# Patient Record
Sex: MALE | Race: WHITE
[De-identification: names, ages, dates, MRNs, and addresses within clinical notes are randomized per-mention and may not be internally consistent; named-entity substitution may affect disease eponyms.]

---

## 2018-07-26 ENCOUNTER — HOSPITAL ENCOUNTER (INPATIENT)
Dept: HOSPITAL 68 - ERH | Age: 53
LOS: 12 days | DRG: 881 | End: 2018-08-07
Attending: PSYCHIATRY & NEUROLOGY | Admitting: PSYCHIATRY & NEUROLOGY
Payer: COMMERCIAL

## 2018-07-26 VITALS — HEIGHT: 69 IN | BODY MASS INDEX: 36.14 KG/M2 | WEIGHT: 244 LBS

## 2018-07-26 DIAGNOSIS — F32.9: Primary | ICD-10-CM

## 2018-07-26 DIAGNOSIS — F42.9: ICD-10-CM

## 2018-07-26 LAB
ABSOLUTE GRANULOCYTE CT: 5.7 /CUMM (ref 1.4–6.5)
BASOPHILS # BLD: 0 /CUMM (ref 0–0.2)
BASOPHILS NFR BLD: 0.5 % (ref 0–2)
EOSINOPHIL # BLD: 0.1 /CUMM (ref 0–0.7)
EOSINOPHIL NFR BLD: 1.6 % (ref 0–5)
ERYTHROCYTE [DISTWIDTH] IN BLOOD BY AUTOMATED COUNT: 12.9 % (ref 11.5–14.5)
GRANULOCYTES NFR BLD: 64 % (ref 42.2–75.2)
HCT VFR BLD CALC: 39.8 % (ref 42–52)
LYMPHOCYTES # BLD: 2.5 /CUMM (ref 1.2–3.4)
MCH RBC QN AUTO: 29.1 PG (ref 27–31)
MCHC RBC AUTO-ENTMCNC: 32.9 G/DL (ref 33–37)
MCV RBC AUTO: 88.4 FL (ref 80–94)
MONOCYTES # BLD: 0.5 /CUMM (ref 0.1–0.6)
PLATELET # BLD: 201 /CUMM (ref 130–400)
PMV BLD AUTO: 9.1 FL (ref 7.4–10.4)
RED BLOOD CELL CT: 4.51 /CUMM (ref 4.7–6.1)
WBC # BLD AUTO: 8.9 /CUMM (ref 4.8–10.8)

## 2018-07-26 PROCEDURE — G0480 DRUG TEST DEF 1-7 CLASSES: HCPCS

## 2018-07-26 NOTE — CT SCAN REPORT
EXAMINATION:
CT ABDOMEN AND PELVIS WITH CONTRAST
 
CLINICAL INFORMATION:
Left lower quadrant pain. CVA tenderness.
 
COMPARISON:
None
 
TECHNIQUE:
Multidetector volumetric imaging was performed of the abdomen and pelvis
following IV administration of 95 mL of Optiray 320 intravenous contrast.
Sagittal and coronal reformatted images were obtained on the technologist's
workstation.
 
DLP:
1047.46 mGy-cm
 
FINDINGS:
 
LUNG BASES: The visualized lung bases are unremarkable.
 
LIVER, GALLBLADDER, AND BILIARY TREE: The liver is normal in size, shape, and
attenuation. No focal hepatic lesion or biliary ductal dilatation is present.
The gallbladder is unremarkable with no evidence of radiopaque gallstones,
gallbladder wall thickening, or obvious pericholecystic inflammatory changes.
 
 
PANCREAS: Unremarkable.
 
SPLEEN: Unremarkable.
 
ADRENAL GLANDS: Unremarkable.
 
KIDNEYS AND URETERS: The kidneys are normal in size, shape, and attenuation.
No hydronephrosis, hydroureter, or calculi seen. No perinephric stranding.
 
BLADDER: Unremarkable.
 
GASTROINTESTINAL TRACT: The small and large bowel are unremarkable. The
appendix is surgically absent.
 
Mesentery: There is slight haziness of the mid mesentery. This could be
chronic or related to mesenteritis but there is no lymphadenopathy. No
inflammation. No free fluid. No free air.
 
ABDOMINAL WALL: No significant hernia is appreciated.
 
LYMPH NODES: Normal.
 
VASCULAR: Unremarkable.
 
PELVIC VISCERA: Unremarkable.
 
OSSEOUS STRUCTURES: Unremarkable.  There is linear soft tissue ossification
along the fascial plane superficial to the right buttocks and adjacent to the
left inferior sacroiliac joint. This is consistent with a chronic change.
There is no mass or inflammation.
 
IMPRESSION:
No acute abnormality CT scan abdomen and pelvis.

## 2018-07-26 NOTE — ED GI/GU/ABDOMINAL COMPLAINT
History of Present Illness
 
General
Chief Complaint: Abdominal Pain/Flank Pain
Stated Complaint: UPPER ABD PAIN
Source: patient
Exam Limitations: no limitations
 
Vital Signs & Intake/Output
Vital Signs & Intake/Output
 Vital Signs
 
 
Date Time Temp Pulse Resp B/P B/P Pulse O2 O2 Flow FiO2
 
     Mean Ox Delivery Rate 
 
2113 97.7 89 16 125/76     
 
2018 97.7 89  125/76     
 
1954 97.7 89 16 121/71     
 
 1947 97.7 89  121/71     
 
 1810  70  137/81     
 
 1247 96.9 70  135/84     
 
 1057 98.4 92 16 105/55  96   
 
 1053 98.4 86 20 158/96  96   
 
 0851 98.7 65 18 114/68  96   
 
 
 ED Intake and Output
 
 
  0000  1200
 
Intake Total  
 
Output Total  
 
Balance  
 
   
 
Patient 244 lb 
 
Weight  
 
 
 
Allergies
Coded Allergies:
No Known Allergies (18)
 
Reconcile Medications
Aspirin (Ecotrin*) 81 MG TABLET.DR   1 TAB PO QAM HEART/BLOOD  (Reported)
Atenolol 50 MG TABLET   1 TAB PO QHS BP  (Reported)
Atorvastatin Calcium (Lipitor) 40 MG TABLET   1 TAB PO QPM CHOLESTEROL  (
Reported)
Clonazepam 1 MG TABLET   1 TAB PO BID ANXIETY  (Reported)
Fluoxetine HCl 40 MG CAPSULE   2 CAP PO QAM MENTAL HEALTH  (Reported)
Gabapentin 600 MG TABLET   1 TAB PO TID NERVE PAIN  (Reported)
Levothyroxine Sodium (Levoxyl) 50 MCG TABLET   1 TAB PO DAILY AC THYROID  (
Reported)
Metformin HCl 500 MG TABLET   1 TAB PO QAM DM  (Reported)
Prazosin HCl 2 MG CAPSULE   2 CAP PO QHS MENTAL HEALTH  (Reported)
Quetiapine Fumarate (Seroquel) 400 MG TABLET   2 TAB PO QHS MENTAL HEALTH  (
Reported)
Temazepam (Restoril) 30 MG CAPSULE   1 CAP PO QHS SLEEP  (Reported)
 
Triage Note:
PT BIBA FROM HOME WITH C/O 9/10 MID UPPER ABD PAIN
THAT STARTED 3 DAYS AGO AND HAS GOTTEN
PROGRESSIVELY WORSE. SAW PCP FOR THE PAIN 2 DAYS
AGO AND HAS BEEN TAKING TRAMADOL SINCE THEN WITH
REPORTED NO EFFECT. PAIN GOT WORSE TODAY, PCP
ADVISED HIM TO COME TO ED. REPROTS NAUSEA, NO
VOMITING. DENEIS CP/SOB. DENIES CARDIAC HX.
Triage Nurses Notes Reviewed? yes
Onset: Gradual
Duration: day(s):
Timing: recent history
Quality/Severity: severe
Severity Numbers: 8
Location: left upper quadrant
HPI:
53 y/o male with hx of anxiety, depression, HTN presents to the ED with 
complaints of abdominal pain since Monday. Pt states he ate salmon Monday night 
and went to his PCP Tuesday morning with thoughts of having possible food 
poisoning. Pt states the PCP gave him 20mg of Tramadol to take and said if the 
pain does not subside in a few days to then go to the ED. Pt states the pain is 
localized to the LUQ region and describes it as a sharp pain rating it at a 8/10
to sometimes even 10/10 on pain scale. There is no position that makes the pain 
better or worse. He states the pain has progressively been getting worse, even 
with prescription strength ibuprofen and tramadol.  Pt also admits to having a 
"coca cola" colored urine this morning and a black looking stool. He has never 
had either of these symptoms before. Pt also states his girlfriend passed away 
about 6 months ago and he has been having suicidal ideation ever since. He does 
admit that he has thought of a plan a few times but never went through with it. 
He denies any homicidal ideations or drug/alcohol use. Associated GI symptoms 
are N/D, low back pain, and 35 lb weight loss since April due to his new low 
carb diet. Pt denies dysuria, polyuria, constipation, CP, SOB, fever, chills, 
fatigue, or pain elsewhere.
(Chasity Bass)
 
Past History
 
Travel History
Traveled to [place] past 21 day No
 
Medical History
Any Pertinent Medical History? see below for history
Neurological: NEUROPATHY
EENT: NONE
Cardiovascular: hypertension, hyperlipidemia
Respiratory: NONE
Gastrointestinal: NONE
Hepatic: NONE
Renal: NONE
Musculoskeletal: NONE
Psychiatric: anxiety, depression
Endocrine: hypothyroidism
Blood Disorders: NONE
Cancer(s): NONE
GYN/Reproductive: NONE
 
Surgical History
Surgical History: appendectomy, cholecystectomy
 
Psychosocial History
What is your primary language English
Tobacco Use: Current Not Daily
ETOH Use: denies use
 
Family History
Hx Contributory? No
(Chasity Bass)
 
Review of Systems
 
Review of Systems
Constitutional:
Reports: no symptoms. 
EENTM:
Reports: no symptoms. 
Respiratory:
Reports: no symptoms. 
Cardiovascular:
Reports: no symptoms. 
GI:
Reports: see HPI. 
Genitourinary:
Reports: see HPI. 
Musculoskeletal:
Reports: no symptoms. 
Skin:
Reports: no symptoms. 
Neurological/Psychological:
Reports: see HPI. 
Hematologic/Endocrine:
Reports: no symptoms. 
Immunologic/Allergic:
Reports: no symptoms. 
All Other Systems: Reviewed and Negative
(Sharita MANDUJANO,Chasity Lyons)
 
Physical Exam
 
Physical Exam
General Appearance: well developed/nourished, no apparent distress, alert, awake
Head: atraumatic, normal appearance
Eyes:
Bilateral: normal appearance. 
Ears, Nose, Throat, Mouth: hearing grossly normal
Neck: normal inspection, supple, full range of motion
Respiratory: normal breath sounds, no respiratory distress, lungs clear
Cardiovascular: regular rate/rhythm
Gastrointestinal: normal bowel sounds, soft, no organomegaly, LUQ/epigastric 
tenderness
Back: normal inspection, normal range of motion
Extremities: normal range of motion
Neurologic/Psych: awake, alert, oriented x 3
Skin: intact, normal color, warm/dry
 
Core Measures
ACS in differential dx? No
Sepsis Present: No
Sepsis Focused Exam Completed? No
(Chasity Bass)
 
Progress
Differential Diagnosis: bowel obstruction, diverticulitis, gastritis, hernia, 
inflamm bowel dis, pancreatitis, peptic ulcer, PUD/GERD, SBO, ureterolithiasis, 
UTI/pyelo, depression, suicidal ideation
Plan of Care:
 Orders
 
 
Procedure Date/time Status
 
Regular Diet  B Active
 
THYROID STIMULATING HORMONE  0600 Active
 
THYROXINE  0600 Active
 
LIPID PANEL  0600 Active
 
GLYCOSYLATED HGB  0600 Active
 
FREE T4  0600 Active
 
Regular Diet  D Complete
 
Patient Data - inpatient psych  1634 Active
 
Admit to inpatient psych  1634 Active
 
Vital Signs  1237 Active
 
Inpt Psych Teach/Educate  1237 Active
 
Nutritional Intake, Monitor  1237 Active
 
Inpt Psych Auricular Acupunctu  1237 Active
 
Admit to inpatient psych  1121 Active
 
ED Holding Orders  1121 Active
 
Code Status  1121 Active
 
INIT HSP (30 MIN)   UNK Complete
 
CIWA   UNK Active
 
Activity/Ambulation   UNK Active
 
Intake & Output  2967 Complete
 
 
 Current Medications
 
 
  Sig/William Start time  Last
 
Medication Dose  Stop Time Status Admin
 
Clonazepam 1 MG 0800,1400  0800 AC 
 
(Klonopin 1MG Tab)    0759  
 
Gabapentin 600 MG TID 2100 AC 
 
(Neurontin)     
 
Prazosin HCl 4 MG AT BEDTIME 
 
(Minipress 2 Mg.)     
 
Atenolol 50 MG 
 
(Tenormin)     
 
Quetiapine Fumarate 800 MG 
 
(Seroquel)     
 
Atorvastatin Calcium 40 MG  170
 
(Lipitor)     1723
 
Metformin HCl 500 MG 0800, 170
 
(Glucophage)     1723
 
Lorazepam 2 MG Q1 AS NEEDED PRN  164 AC 
 
(Ativan)     
 
Lorazepam 1 MG Q1 AS NEEDED PRN  164 AC 
 
(Ativan)     
 
Levothyroxine Sodium 0.05 MG DAILY AC  0958 AC 
 
(Synthroid)     0617
 
Aspirin 81 MG DAILY  0951 AC 
 
(Aspirin)     1020
 
Famotidine 20 MG DAILY  0950 AC 
 
(Pepcid)     1020
 
Fluoxetine HCl 80 MG DAILY  0950 AC 
 
(Prozac)     1020
 
 
 Laboratory Tests
 
 
 
18 0620:
Hemoglobin A1c Pending, Triglycerides Pending, Cholesterol Pending, LDL 
Cholesterol, Calc Pending, HDL Cholesterol Pending, Cholesterol/HDL Ratio 
Pending, TSH Pending, Free T4 Pending, Thyroxine (T4) Pending
 
18 1634:
Free T4 Cancelled
 
No abnormal findings on patient CT scan to explain patient's left upper quadrant
abdominal pain.  Patient's labs are stable, H/H is stable, mild leukocytosis.  
Lipase is within normal limits, no acute pancreatitis.  No evidence of UTI.  
Patient will likely require GI follow-up regarding his persistent pain.
 
The patient was signed out to Dr. Nichole pending crisis evaluation and 
disposition.
Diagnostic Imaging:
Viewed by Me: CT Scan.  Discussed w/RAD: CT Scan. 
Radiology Impression: PATIENT: ZENAIDA PRIETO  MEDICAL RECORD NO: [id number] PRESENT 
AGE: 52  PATIENT ACCOUNT NO: [id number] : [birth year]  LOCATION: [facility] ORDERING 
PHYSICIAN: Chasity MANDUJANO     SERVICE DATE:  EXAM TYPE: CAT -
CT ABD & PELVIS W IV CONTRAST EXAMINATION: CT ABDOMEN AND PELVIS WITH CONTRAST 
CLINICAL INFORMATION: Left lower quadrant pain. CVA tenderness. COMPARISON: None
TECHNIQUE: Multidetector volumetric imaging was performed of the abdomen and 
pelvis following IV administration of 95 mL of Optiray 320 intravenous contrast.
Sagittal and coronal reformatted images were obtained on the technologist's 
workstation. DLP: 1047.46 mGy-cm FINDINGS: LUNG BASES: The visualized lung bases
are unremarkable. LIVER, GALLBLADDER, AND BILIARY TREE: The liver is normal in 
size, shape, and attenuation. No focal hepatic lesion or biliary ductal 
dilatation is present. The gallbladder is unremarkable with no evidence of 
radiopaque gallstones, gallbladder wall thickening, or obvious pericholecystic 
inflammatory changes. PANCREAS: Unremarkable. SPLEEN: Unremarkable. ADRENAL 
GLANDS: Unremarkable. KIDNEYS AND URETERS: The kidneys are normal in size, shape
, and attenuation. No hydronephrosis, hydroureter, or calculi seen. No 
perinephric stranding. BLADDER: Unremarkable. GASTROINTESTINAL TRACT: The small 
and large bowel are unremarkable. The appendix is surgically absent. Mesentery: 
There is slight haziness of the mid mesentery. This could be chronic or related 
to mesenteritis but there is no lymphadenopathy. No inflammation. No free fluid.
No free air. ABDOMINAL WALL: No significant hernia is appreciated. LYMPH NODES: 
Normal. VASCULAR: Unremarkable. PELVIC VISCERA: Unremarkable. OSSEOUS STRUCTURES
: Unremarkable.  There is linear soft tissue ossification along the fascial 
plane superficial to the right buttocks and adjacent to the left inferior 
sacroiliac joint. This is consistent with a chronic change. There is no mass or 
inflammation. IMPRESSION: No acute abnormality CT scan abdomen and pelvis. 
DICTATED BY: Yoseph Vang MD  DATE/TIME DICTATED:18 
:RAD.RASHID  DATE/TIME TRANSCRIBED:18 CONFIDENTIAL, DO NOT COPY 
WITHOUT APPROPRIATE AUTHORIZATION.  <Electronically signed in Other Vendor 
System>                                                                         
              SIGNED BY: Yoseph Vang MD 18
Initial ED EKG: sinus rhythm @79bpm,PAC, nonspecific ST changes
Hand-Off
   Endorsed To:
Valeriano Nichole MD
   Endorsed Time: 0100
   Pending: consult (crisis)
(Sharita MANDUJANO,Chasity Lyons)
Hand-Off
   Endorsed To:
Ramsey Santos MD
   Endorsed Time: 0700
   Pending: consult
(Valeriano Nichole MD)
Comments:
 1100 52-year-old male presents with abdominal pain.  Workup is negative.  
The patient had a soft benign abdomen, nontender.  He is requesting crisis 
counselor who have seen the patient.  Presentation consistent with ACS or 
cardiopulmonary pathology.
(Elgin STYLES,[facility])
 
Departure
 
Departure
Disposition: STILL A PATIENT
Condition: Stable
Clinical Impression
Primary Impression: Abdominal pain
Qualifiers:  Abdominal location: left upper quadrant Qualified Code: R10.12 - 
Left upper quadrant pain
Secondary Impressions: 
Depression
Qualifiers:  Depression Type: unspecified Qualified Code: F32.9 - Major 
depressive disorder, single episode, unspecified
 
Suicidal ideation
 
Departure Forms:
Customer Survey
General Discharge Information
(Sharita MANDUJANO,Chasity Lyons)
 
Psych Admission Note
Psychiatric Admission:
I have seen and evaluated ZENAIDA PRIETO.
 
I have also reviewed all the pertinent lab results and diagnostic results.
 
ZENAIDA PRIETO will be admitted to our inpatient Psychiatric unit for treatment 
and care.
 
 
PA/NP Co-Sign Statement
Statement:
ED Attending supervision documentation-
 
x I saw and evaluated the patient. I have also reviewed all the pertinent lab 
results and diagnostic results. I agree with the findings and the plan of care 
as documented in the PA's/NP's documentation. 
 
[] I have reviewed the ED Record and agree with the PA's/NP's documentation.
 
[] Additions or exceptions (if any) to the PAs/NP's note and plan are 
summarized below:
[]
 
(Valeriano Nichole MD)
 
PA/NP Co-Sign Statement
Statement:
ED Attending supervision documentation-
 
[] I saw and evaluated the patient. I have also reviewed all the pertinent lab 
results and diagnostic results. I agree with the findings and the plan of care 
as documented in the PA's/NP's documentation. 
 
[x] I have reviewed the ED Record and agree with the PA's/NP's documentation.
 
[] Additions or exceptions (if any) to the PAs/NP's note and plan are 
summarized below:
[]
 
(Danielle STYLES,Ramsey KNIGHT)
 
 
(Danielle STYLES,Ramsey KNIGHT)

## 2018-07-27 VITALS — DIASTOLIC BLOOD PRESSURE: 71 MMHG | SYSTOLIC BLOOD PRESSURE: 121 MMHG

## 2018-07-27 VITALS — DIASTOLIC BLOOD PRESSURE: 76 MMHG | SYSTOLIC BLOOD PRESSURE: 125 MMHG

## 2018-07-27 VITALS — DIASTOLIC BLOOD PRESSURE: 84 MMHG | SYSTOLIC BLOOD PRESSURE: 135 MMHG

## 2018-07-27 VITALS — SYSTOLIC BLOOD PRESSURE: 137 MMHG | DIASTOLIC BLOOD PRESSURE: 81 MMHG
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
## 2018-07-27 NOTE — ED PSYCH CRISIS CONSULTATION
Crisis Consult
 
Basic Assessment
Date of Consult: 18
Responsible Person/Accompanied By: self
Insurance Authorization:
Insurance #1:
 
[facility]
Phone number: 
Policy number: [id number]
Group number: 
Authorization number: 
 
 
ED Provider:
Patient's ED Provider: Chasity Bass
 
Primary Care Physician:
Patient's PCP: Colt Weber MD
PCP's Phone Number: [phone]
 
Current Psychiatrist: [facility]
Chief Complaint: Psychiatric Related Complaint
Patient's Quote: "I recently lost my gf of 7 years suddenly"
Present Illness:
Pt is a 52  year old single domiciled male self presenting to the ED initially 
with abdominal pain and then reported SI to ED staff. 
 
Pt states he recently (9 months ago) lost his girlfriend of 7 years after she 
 from complications related to pneuomia. He states she was on life support 
and  within 1 week of being diagnosed with pneumonia. Pt states he and his 
gf were living in [place] together and he moved up here 6 months ago to be with 
her family and her ashes, which are burried here in [place]. He states has been 
hospitalized 2x psychiatrically after his gf  (1x in [place] and most recently at
[facility] 1 month ago). His discharge plan from [facility] 
was [place]. Pt states  he attended IOP for the 1st time on Wednesday. 
He does not think going to [place] by bus is reasonable for him as he takes 
the bus. He states he took him 3 hours to get to [place] from [place] by bus
, IOP lasts 3 hours, then another 3 hours to get back home. He states this is 
exhausting and too much for anyone to have to do to get treatment. 
 
Currently, pt states he thinks about suicide daily over the last month. He 
reports he wants to be dead to be with his girlfriend. He has thought about 
drinking a bottle of tequila to pass out, stepping in front of a train, and 
tying belts to a ceiling fan to hang himself. It is unclear what prevents him 
from acting on these thoughts. He did mention that he finds comfort at [employer]. 
He was going to [employer] 3-4 times per week but this [employer] is now closed for 2 
months for renovations. He is socially isolated- he lives alone, no longer 
attends [employer] and doesn't want to "bother" his girlfriend's family. He thinks 
that since one of the girlfriend's famiy members gave him an apartment to live 
in for free, he shouldn't be asking for any more help. 
 
Pt has a long psychiatric history recieving treatment in [place] and [place] 
prior to living in [place]. Pt had prior substance abuse issues with Alcohol and no 
longer has issues with alochol at this time. Last drink was Mother's day- 1 
glass of wine. 
 
Crisis consulted with Dr. Bailey, on call psychiatrist. Pt is being admitted to 
[facility] voluntarily for SI and the inability to create a safety plan outside the 
hospital. 
Patient's Address:
[address] [place] [address]
Home Phone Number: [phone]
Other Phone Number: 
 
Who Do You Live With? Patient/Self
Family/Informants Interviewed: no family/collateral ID'd
Allergies -
Coded Allergies:
No Known Allergies (18)
 
Current Medications -
Scheduled Medications
Aspirin (Ecotrin*) 81 MG TABLET.DR   1 TAB PO QAM HEART/BLOOD  (Reported)
     Entered as Reported by Aisha Jean on 18
Atenolol 50 MG TABLET   1 TAB PO QHS BP  (Reported)
     Entered as Reported by Aisha Jean on 18
Atorvastatin Calcium (Lipitor) 40 MG TABLET   1 TAB PO QPM CHOLESTEROL  (
Reported)
     Entered as Reported by Aisha Jean on 18
Clonazepam 1 MG TABLET   1 TAB PO BID ANXIETY  (Reported)
     Entered as Reported by Aisha Jean on 18
Fluoxetine HCl 40 MG CAPSULE   2 CAP PO QAM MENTAL HEALTH  (Reported)
     Entered as Reported by Aisha Jean on 18
Gabapentin 600 MG TABLET   1 TAB PO TID NERVE PAIN  (Reported)
     Entered as Reported by Aisha Jean on 18
Levothyroxine Sodium (Levoxyl) 50 MCG TABLET   1 TAB PO DAILY AC THYROID  (
Reported)
     Entered as Reported by Aisha Jean on 18
Metformin HCl 500 MG TABLET   1 TAB PO QAM DM  (Reported)
     Entered as Reported by Aisha Jean on 18
Prazosin HCl 2 MG CAPSULE   2 CAP PO QHS MENTAL HEALTH  (Reported)
     Entered as Reported by Aisha Jean on 18
Quetiapine Fumarate (Seroquel) 400 MG TABLET   2 TAB PO QHS MENTAL HEALTH  (
Reported)
     Entered as Reported by Aisha Jean on 18
Temazepam (Restoril) 30 MG CAPSULE   1 CAP PO QHS SLEEP  (Reported)
     Entered as Reported by Aisha Jean on 18
 
Laboratory Results:
 Laboratory Tests
 
18 2357:
Lactic Acid Cancelled
 
18 224:
Urine Opiates Screen < 100, Methadone Screen 45, Barbiturate Screen < 60, Ur 
Phencyclidine Scrn < 6.00, Amphetamines Screen < 100, U Benzodiazepines Scrn 146
, Urine Cocaine Screen < 50, Urine Cannabis Screen < 5.00, Urine Color YEL, 
Urine Clarity CLEAR, Urine pH 6.0, Ur Specific Gravity 1.025, Urine Protein NEG,
Urine Ketones NEG, Urine Nitrite NEG, Urine Bilirubin NEG, Urine Urobilinogen 
0.2, Ur Leukocyte Esterase NEG, Ur Microscopic EXAM NOT REQUIRED, Urine 
Hemoglobin NEG, Urine Glucose NEG
 
18 2140:
Anion Gap 12, Estimated GFR > 60, BUN/Creatinine Ratio 22.5, Glucose 105  H, 
Lactic Acid 1.0, Calcium 9.9, Total Bilirubin 0.3, AST 23, ALT 33, Alkaline 
Phosphatase 74, Troponin I < 0.01, Total Protein 7.4, Albumin 4.3, Globulin 3.1,
Albumin/Globulin Ratio 1.4, Lipase 232, CBC w Diff NO MAN DIFF REQ, RBC 4.51  L,
MCV 88.4, MCH 29.1, MCHC 32.9  L, RDW 12.9, MPV 9.1, Gran % 64.0, Lymphocytes % 
27.7, Monocytes % 6.2, Eosinophils % 1.6, Basophils % 0.5, Absolute Granulocytes
5.7, Absolute Lymphocytes 2.5, Absolute Monocytes 0.5, Absolute Eosinophils 0.1,
Absolute Basophils 0, Serum Alcohol < 10.0
 
18:
Serum Alcohol Cancelled
 
 
Past History
 
Past Medical History
Neurological: NEUROPATHY
EENT: NONE
Cardiovascular: hypertension, hyperlipidemia
Respiratory: NONE
Gastrointestinal: NONE
Hepatic: NONE
Renal: NONE
Musculoskeletal: NONE
Psychiatric: depression, OCD, per pt, PTSD, per pt
Endocrine: hypothyroidism
Blood Disorders: NONE
Cancer(s): NONE
GYN/Reproductive: NONE
 
Past Surgical History
Surgical History: appendectomy, cholecystectomy
 
Psychosocial History
Strengths/Capabilities:
Pt is seeking treatment and is willing to sign in voluntarily for psychiatric 
admission. 
Physical Limitations (Interventions):
none
 
Psychiatric Treatment History
Psych Treatment
   Psychiatric Treatment Yes
   Inpatient Treatment Yes
   Outpatient Treatment Yes
   Location of Treatment - [facility]; [facility] [place] 
started 
   Reason for Treatment
SI, Depression
   Dates of Treatment [facility]- 1 month ago, started IOP Wednesday
   Response to Treatment
fair, pt states the distance is too far to continue with his current IOP in 
[place]
Diagnosis by History:
Depression
OCD
PTSD
Alcohol Use Disorder
 
Substance Use/Abuse History
Drug Use/Abuse 1 
   Substances Used/Abused Yes
   Substance Used/Abused Alcohol
   First Use 10
   Last Used May 2018
   How much used/taken 1 glass of wine
   How often not very often
   For how long pt used to have a problem w/ ETOH
   Route of use oral
Drug Use/Abuse 2 
   Substances Used/Abused Yes
   Substance Used/Abused Nicotine
   Last Used 18
   How much used/taken 4-5 cigarettes per day
   How often daily
   For how long years on and off
   Route of use inhalation
Drug Use/Abuse 3 
   Substances Used/Abused Yes
   Substance Used/Abused Cocaine
   Last Used late 20s
Drug Use/Abuse 4 
   Substances Used/Abused Yes
   Substance Used/Abused Prescribed Opiates
   Last Used 6 years ago
   For how long pt reports he had an issue with prescribed opiates 6 year ago
 
Substance Abuse Treatment
Substance Abuse Treatment
   Past Substance Abuse [place] Yes
   Inpatient Treatment Yes
   Outpatient Treatment Yes
   Location of Treatment [place]
   Reason for Treatment
etoh abuse
   Dates of Treatment years ago
   Response to Treatment
Pt reports he did well because it was a residential IOP then he transferred to a
half way house for 6 months then went to sober living 
 
Current Mental Status
 
Mental Status
Orientation: Person, Place, Situation
Affect: Sad
Speech: WNL
Neuro-vegetative: Anhedonia, Appetite Decreased, Concentration Poor, Energy 
Decreased, Helpless, Sleep Disturbance
 
Appearance
Appearance- Dress/Hygiene:
Pt presents in hospital scrubs 
Pt pointed out scars on his elbows from 6 previous surgeries for nueropathy
 
Behaviors
Thought Process: WNL
Thought Content: WNL
Memory: WNL
Insight: Fair
 
SI/HI Risk Assessment
Past Suicidal Ideation/Attempts Yes
Current Suicidal Ideation/Att Yes
Past Homicidal Ideation/Att: No
Current Homicidal Ideation/Attempts No
Degree of Intent: Thoughts/No Intent
Danger To: Self
Risk Factors: SA/MH hospitalized, lives alone, male, limited support
Lethality Ratin
 
PTSD Checklist
PTSD Done? patient declined
 
ED Management
Sitter: Yes
Restraints: No
 
DSM5/PS Stressors/Medical Prob
Diagnosis' (DSM 5, Stressors, Medical):
F32.9 Unspecified Depressive Disorder
OCD, per patient
PTSD, per patient 
 
Stressors: girlfriend  9 months ago, unemployed on disability 
 
Medical: Hypertension, hyperlipimedia 
Current GAF: 22
 
Departure
 
Disposition
Psych Medical Clearance
   Date: 18
   Medically Cleared at: 0810
   Time Started: 0810
   Time Ended: 0840
   Psychiatrist Consulted: Jinny Bailey MD
Date Disposition Established: 18
Time Disposition Established: 1030
Plan for Disposition -
   Modality: Inpatient Psychiatry
   Facility: [facility]
Rationale for Disposition:
Pt presents with SI- plans to overdose with tequila, jump in front of a train or
hang himself with belts from a ceiling fan in his living room. Pt is unable to 
contract for safety.
Type of IP Admission: Voluntary
Referrals
Colt Weber MD (PCP/Family)

## 2018-07-27 NOTE — HISTORY & PHYSICAL
General Information and HPI
MD Statement:
I have seen and personally examined ZENAIDA PRIETO and documented this H&P.
 
The patient is a 52 year old M who presented with a patient stated chief 
complaint of "I recently lost my girlfriend of 7 years suddenly".
 
Source of Information: patient
Exam Limitations: no limitations
History of Present Illness:
52 year old male came to the emergency room initially complaining of abdominal 
pain (work up was negative) and then reported SI to the ED staff. He has been 
hospitalized twice in [facility]. after the girl friend  one in [place] one in 
[facility], one month ago went to [facility].
Patient thinks about suicide every day, feels socally isolated.
 
Allergies/Medications
Allergies:
Coded Allergies:
No Known Allergies (18)
 
Home Med list
Aspirin (Ecotrin*) 81 MG TABLET.DR   1 TAB PO QAM HEART/BLOOD  (Reported)
Atenolol 50 MG TABLET   1 TAB PO QHS BP  (Reported)
Atorvastatin Calcium (Lipitor) 40 MG TABLET   1 TAB PO QPM CHOLESTEROL  (
Reported)
Clonazepam 1 MG TABLET   1 TAB PO BID ANXIETY  (Reported)
Fluoxetine HCl 40 MG CAPSULE   2 CAP PO QAM MENTAL HEALTH  (Reported)
Gabapentin 600 MG TABLET   1 TAB PO TID NERVE PAIN  (Reported)
Levothyroxine Sodium (Levoxyl) 50 MCG TABLET   1 TAB PO DAILY AC THYROID  (
Reported)
Metformin HCl 500 MG TABLET   1 TAB PO QAM DM  (Reported)
Prazosin HCl 2 MG CAPSULE   2 CAP PO QHS MENTAL HEALTH  (Reported)
Quetiapine Fumarate (Seroquel) 400 MG TABLET   2 TAB PO QHS MENTAL HEALTH  (
Reported)
Temazepam (Restoril) 30 MG CAPSULE   1 CAP PO QHS SLEEP  (Reported)
 
Compliance With Home Meds: UNKNOWN
 
Past History
 
Travel History
Traveled to [place] past 21 day No
 
Medical History
Neurological: NEUROPATHY
EENT: NONE
Cardiovascular: hypertension, hyperlipidemia
Respiratory: NONE
Gastrointestinal: NONE
Hepatic: NONE
Renal: NONE
Musculoskeletal: chronic back pain, disk herniation
Psychiatric: depression, insomnia, substance abuse, OCD, per pt PTSD, per pt
Endocrine: hypothyroidism, BORDERLINE DM 2
Blood Disorders: NONE
Cancer(s): NONE
GYN/Reproductive: NONE
History of MRSA: No
History of VRE: No
History of CDIFF: No
Isolation History: Standard
 
Surgical History
Surgical History: appendectomy, cholecystectomy
 
Past Family/Social History
 
Psychosocial History
Where do you live? Home
ETOH Use: denies use
 
Employment History
Employment Disability
Profession/Employer pt used to work for [employer] as 
 
Review of Systems
 
Review of Systems
Constitutional:
Reports: see HPI. 
 
Exam & Diagnostic Data
Last 24 Hrs of Vital Signs/I&O
 Vital Signs
 
 
Date Time Temp Pulse Resp B/P B/P Pulse O2 O2 Flow FiO2
 
     Mean Ox Delivery Rate 
 
 1810  70  137/81     
 
 1247 96.9 70  135/84     
 
 1057 98.4 92 16 105/55  96   
 
 1053 98.4 86 20 158/96  96   
 
 0851 98.7 65 18 114/68  96   
 
 0611 98.1 69 18 107/71  98 Room Air  
 
 0344 98.0 78 16 116/70  99 Room Air  
 
 0022 97.4 76 18 114/69  98 Room Air  
 
 2257 97.9 74 18 113/70  97 Room Air  
 
 2054 98.4 87 18 141/75  96 Room Air  
 
 
 Intake & Output
 
 
  1600  0800  0000
 
Intake Total   1000
 
Output Total   500
 
Balance   500
 
    
 
Intake, IV   1000
 
Output, Urine   500
 
Patient 244 lb  230 lb
 
Weight   
 
Weight   Reported by Patient
 
Measurement   
 
Method   
 
 
 
 
Physical Exam
General Appearance Alert, Oriented X3, Cooperative, No Acute Distress
Skin No Rashes
HEENT PERRLA, EOMI
Neck Supple, No JVD, No thryomegaly
Lymphatic Axillary nl, Cervical nl
Cardiovascular Regular Rate, No Murmurs
Lungs Clear to Auscultation, Normal Air Movement
Abdomen Soft, No Tenderness, No Hepatospenomegaly
Neurological
   Exam Findings: Normal Gait, Normal Speech, Strength at 5/5 X4 Ext, Normal 
Tone, Sensation Intact, Cranial Nerves 3-12 NL, Reflexes 2+
   Cranial Nerves II through XII:
intact
Extremities No Edema, Normal Pulses, No Tenderness/Swelling
Vascular Normal Pulses, Pulses Symmetrical
Last 24 Hrs of Labs/Boby:
 Laboratory Tests
 
18 1634:
Free T4 Cancelled
 
18 2357:
Lactic Acid Cancelled
 
18 2242:
Urine Opiates Screen < 100, Methadone Screen 45, Barbiturate Screen < 60, Ur 
Phencyclidine Scrn < 6.00, Amphetamines Screen < 100, U Benzodiazepines Scrn 146
, Urine Cocaine Screen < 50, Urine Cannabis Screen < 5.00, Urine Color YEL, 
Urine Clarity CLEAR, Urine pH 6.0, Ur Specific Gravity 1.025, Urine Protein NEG,
Urine Ketones NEG, Urine Nitrite NEG, Urine Bilirubin NEG, Urine Urobilinogen 
0.2, Ur Leukocyte Esterase NEG, Ur Microscopic EXAM NOT REQUIRED, Urine 
Hemoglobin NEG, Urine Glucose NEG
 
180:
Anion Gap 12, Estimated GFR > 60, BUN/Creatinine Ratio 22.5, Glucose 105  H, 
Lactic Acid 1.0, Calcium 9.9, Total Bilirubin 0.3, AST 23, ALT 33, Alkaline 
Phosphatase 74, Troponin I < 0.01, Total Protein 7.4, Albumin 4.3, Globulin 3.1,
Albumin/Globulin Ratio 1.4, Lipase 232, CBC w Diff NO MAN DIFF REQ, RBC 4.51  L,
MCV 88.4, MCH 29.1, MCHC 32.9  L, RDW 12.9, MPV 9.1, Gran % 64.0, Lymphocytes % 
27.7, Monocytes % 6.2, Eosinophils % 1.6, Basophils % 0.5, Absolute Granulocytes
5.7, Absolute Lymphocytes 2.5, Absolute Monocytes 0.5, Absolute Eosinophils 0.1,
Absolute Basophils 0, Serum Alcohol < 10.0
 
18:
Serum Alcohol Cancelled
 
 
Diagnostic Data
EKG Results
sinus rate 79 PAC non specific ST-T abnormalities
Other Results
CT scan Abdomen negative.
 
Assessment/Plan
 
As Ranked By This Provider
Problem List:
 1. Suicidal ideation
 
 2. Depression
   Qualifiers
 Depression Type: unspecified Qualified Code: F32.9 - Major depressive disorder,
single episode, unspecified
 
 3. Abdominal pain
   Qualifiers
 Abdominal location: left upper quadrant Qualified Code: R10.12 - Left upper 
quadrant pain
 
 
Miscellaneous
 
Miscellaneous Documentation
Attending Case Discussed With:
Jinny Bailey MD
 
Primary Care Physician:
Colt Weber MD
 
Patient sees these Specialists
psychiatry
Level of Patient Care: [facility]
Consults Needed:
   Consulting Specialty: Psychiatry
   Consulting Physician:
Dr Bailey
   Reason for Consult: SI, depression
 
Resident Review Statement
Resident Statement: examined this patient
 
Attending MD Review Statement
 
Attending Statement
Attending MD Statement: examined this patient

## 2018-07-27 NOTE — CPS PROVIDER INIT ASMT PSYCH
Psychiatric Admission
Crisis Worker's Note Reviewed: Yes
Patient Seen and Examined: Yes
Identifying Information:
Patient is a 52-year-old single white male.
Chief Complaint:
"I recently lost my girlfriend of 7 years suddenly."
Reaction to Hospitalization:
Patient was admitted voluntarily
 
History of Present Illness
Onset of Illness:
The patient was admitted to [facility] about a month ago and he was 
discharged from [facility] with a plan to attend intensive outpatient 
program at [facility].  The patient reported that he has been doing the intensive
outpatient program at [facility].  He has been finding that it has been 
very demanding and stressful because he has to take the bus from [place] 
although it to [facility]'s IOP program he reports that it takes him 
approximately 3 hours to get to [facility] from [place] in the IOP lasts 3 
hours then 3 hours to get back home because of the bus schedule.
Circumstances Leading to Admission:
Increasing depression and thoughts of suicide
Problem(s) Justifying Need for Admission:
Thoughts of suicide
 
Past Psychiatric History
Past Diagnosis(es)- if any:
Major depressive disorder, OCD, posttraumatic stress disorder, alcohol use 
disorder
Past Precipitating Factors- if any:
Alcohol use and loss of his girlfriend suddenly after a quick illness with 
pneumonia
- Include inpatient and outpatient treatment
Treatment History:
Patient was receiving the treatment while he was in [place].  Patient moved up 
from [place] to [place] about 6 months ago.  The patient was admitted to 
[facility] about a month ago and that did the intensive outpatient 
program since.
History of Suicide Attempts or Gestures
Patient denied
Substance Abuse History:
History of alcohol use disorder
Allergies:
Coded Allergies:
No Known Allergies (07/26/18)
 
Home Med List:
Patient was on 800 mg of Seroquel
Aspirin (Ecotrin*) 81 MG TABLET.   1 TAB PO QAM HEART/BLOOD  (Reported)
     Entered as Reported by Aisha Jean on 07/26/18 2238
Atenolol 50 MG TABLET   1 TAB PO QHS BP  (Reported)
     Entered as Reported by Aisha Jean on 07/26/18 2236
Atorvastatin Calcium (Lipitor) 40 MG TABLET   1 TAB PO QPM CHOLESTEROL  (
Reported)
     Entered as Reported by Aisha Jean on 07/26/18 2238
Clonazepam 1 MG TABLET   1 TAB PO BID ANXIETY  (Reported)
     Entered as Reported by Aisha Jean on 07/26/18 2236
Fluoxetine HCl 40 MG CAPSULE   2 CAP PO QAM MENTAL HEALTH  (Reported)
     Entered as Reported by Aisha Jean on 07/26/18 2237
Gabapentin 600 MG TABLET   1 TAB PO TID NERVE PAIN  (Reported)
     Entered as Reported by Aisha Jean on 07/26/18 2237
Levothyroxine Sodium (Levoxyl) 50 MCG TABLET   1 TAB PO DAILY AC THYROID  (
Reported)
     Entered as Reported by Aisha Jean on 07/26/18 2237
Metformin HCl 500 MG TABLET   1 TAB PO QAM DM  (Reported)
     Entered as Reported by Aisha Jean on 07/26/18 2238
Prazosin HCl 2 MG CAPSULE   2 CAP PO QHS MENTAL HEALTH  (Reported)
     Entered as Reported by Aisha Jean on 07/26/18 2236
Quetiapine Fumarate (Seroquel) 400 MG TABLET   2 TAB PO QHS MENTAL HEALTH  (
Reported)
     Entered as Reported by Aisha Jean on 07/26/18 2235
Temazepam (Restoril) 30 MG CAPSULE   1 CAP PO QHS SLEEP
- Include any medical condition(s) that may
- impact the patient's recovery/remission
Past Medical History:
He sees he said he has borderline diabetes.
He has hypothyroidism
 
Past History
 
Medical History
Neurological: NEUROPATHY
EENT: NONE
Cardiovascular: hypertension, hyperlipidemia
Respiratory: NONE
Gastrointestinal: NONE
Hepatic: NONE
Renal: NONE
Musculoskeletal: chronic back pain, disk herniation
Psychiatric: depression, insomnia, substance abuse, OCD, per pt PTSD, per pt
Endocrine: hypothyroidism, BORDERLINE DM 2
Blood Disorders: NONE
Cancer(s): NONE
GYN/Reproductive: NONE
History of MRSA: No
History of VRE: No
History of CDIFF: No
Isolation History: Standard
 
Surgical History
Surgical History: appendectomy, cholecystectomy
 
Psychiatric Family/Social Hx
 
Family History
Psychiatric Illness:
Unexplored
Substance Use:
unexplored
Suicides:
unexplored
 
Social History
Living Situation:
Please see the biopsychosocial assessment by LCSW
Significant Relationships (family/friends):
Please see the biopsychosocial assessment by LCSW
Education:
Please see the biopsychosocial assessment by LCSW
Vocation/Occupation:
Please see the biopsychosocial assessment by LCSW
Legal:
Please see the biopsychosocial assessment by LCSW
 
Healthly Behaviors Screening
 
Tobacco Screening
Tobacco Use from ED Docu: Current Not Daily
- If tobacco counseling indicated
- the following topics are required.
- #1 Recognizing dangerous situations.
- #2 Coping Skills.
- #3 Basic information about quitting.
Status of Tobacco Cessation Counseling: #1, #2 AND #3 Completed
Cessation Med Status Nicotine Gum Ordered
 
Alcohol Screening
- ETOH screen POS if BAL >=80 or Audit-C>= M4/F3
Audit-C Score from Diag Assess: 1
Blood Alcohol Level:
Laboratory Tests
 
 
 07/26 07/26
 
 2135 2140
 
Toxicology  
 
  Serum Alcohol (<10 MG/DL) Cancelled < 10.0
 
 
 
Alcohol Use Screening Results: Neg per Audit C &/or BAL
- If ETOH counseling indicated
- the following topics are required.
- #1 Express concern about the patient's
- drinking at unhealthy levels, include informing
- of national norms for moderate drinking:
- men <= 14 drinks/week, max 4 drinks/occasion
- women <= 7 drinks/week, max 3 drinks/occasion
- #2 Providing feedback, including linking alcohol to
- negative physical effects (liver injury, hypertension)
- negative emotional effects (relationship problems and
- depression)
- negative occupational consequences (reduced work
- performance)
- #3 Advising the patient to abstain from alcohol or
- to drink below national norms for moderate drinking
- (as listed above).
Status of ETOH Use Counseling: N/A B/C NO ETOH Use
 
Metabolic Screening
- Screen if on a Neuroleptic Medication
- Metabolic screening should include:
- Blood Pressure, BMI, Glucose or Hgb A1c, & a
- Lipid profile from within the past 365 days.
Metabolic Screening
Patient on a neuroleptic(s) .
     Enter below results for Hemoglobin A1C, 
     and lipid panel if obtained during the last 365 days.
 
BMI: 36.000     
 
Blood Pressure: 135/84
 
Laboratory Results From [facility] (If applicable):
Lab work ordered
 
Exam and Plan
 
Mental Status Examination
Ambulation Status:
Steady gait
Appearance:
Unremarkable appearance
Attitude towards examiner:
, Cooperative
Psychomotor activity:
Normal psychomotor activity
Behavior:
No abnormal behaviors
Quality of speech:
Normal speech, not pressured, not slurred
Affect:
Constricted affect
Mood:
Reported mood is depressed
Suicidal Ideation:
Reported on and off thoughts of suicide
Homicidal Ideation:
Denied thoughts of violence or homicide
Hallucinations:
Denied hallucinations
Paranoid/Delusional Material:
Denied feeling paranoid, there were no delusions
Difficulties with thought organization:
Denied difficulties with thought organization
Insight:
Seems to have partial insight
Judgment:
Seems to have reasonable judgment in hypothetical situations
Orientation:
He was alert and oriented to time, place, and person.
Cognition:
Did not seem to have difficulties with information processing.
Memory Function:
Did not have any difficulties with short-term memory.
Estimate of intellectual functioning:
Average
 
Assets/Strengths
Patient Identified Assets/Strengths:
Patient is intelligent, motivated, and has supportive family
 
Impression/Plan
Impression and Plan:
52-year-old single white male who was admitted because of increasing depression 
and thoughts of suicide.
- Include all active medical diagnosis that require tx
DSM 5 Diagnosis(es):
Unspecified depressive disorder
Obsessive-compulsive disorder
Posttraumatic stress disorder
Alcohol use disorder
- Initial Tx Plan for Active Psych & Medical Conditions
Treatment Plan:
Inpatient psychiatric care with safety checks every 15 minutes
Resume all medications as per his outpatient providers at [facility] intensive 
outpatient program.
- Factors that would help patient function
- in a less restrictive setting.
Factors:
The patient will be discharge if he has 2 consecutive days without any thoughts 
of suicide

## 2018-07-28 VITALS — DIASTOLIC BLOOD PRESSURE: 56 MMHG | SYSTOLIC BLOOD PRESSURE: 108 MMHG

## 2018-07-28 VITALS — SYSTOLIC BLOOD PRESSURE: 100 MMHG | DIASTOLIC BLOOD PRESSURE: 65 MMHG

## 2018-07-28 VITALS — DIASTOLIC BLOOD PRESSURE: 65 MMHG | SYSTOLIC BLOOD PRESSURE: 100 MMHG

## 2018-07-28 VITALS — DIASTOLIC BLOOD PRESSURE: 66 MMHG | SYSTOLIC BLOOD PRESSURE: 110 MMHG

## 2018-07-28 VITALS — SYSTOLIC BLOOD PRESSURE: 110 MMHG | DIASTOLIC BLOOD PRESSURE: 66 MMHG

## 2018-07-28 VITALS — DIASTOLIC BLOOD PRESSURE: 65 MMHG | SYSTOLIC BLOOD PRESSURE: 134 MMHG

## 2018-07-28 VITALS — SYSTOLIC BLOOD PRESSURE: 135 MMHG | DIASTOLIC BLOOD PRESSURE: 78 MMHG

## 2018-07-28 NOTE — CP SOUTH PROGRESS NOTE PSYCH
Psych (Inpt) Progress Note
Progress Note
Include the following elements, when applicable:
Involvement in the active treatment of the patient with behavioral observations 
of the patient and the patient's response to the treatment.
Review of the ongoing treatment process in the context of the treatment plan.
Indication of how multi-disciplinary staff members are carrying out the 
treatment plan.
Plans for future interventions and recommendations for revision of the treatment
plan.
Liaison with other physicians/providers.
Progress Note:
 
pleasant, cooperative. States that he was not doing well since death of his 
girlfriend less than a year ago, that he was not doing well in [facility] [place] due
to transportation difficulties. Has some support in his girlfriend's family but 
not his. Has been sleeping overall well. Stated that he has "too much in my head
" still but overall feels safe here when discussing suicide, that he has a lot 
of triggers outside but feels safe here. 
 
MSE: pleasant, overweight man, middle aged, well related and well groomed. His 
speech is normal. His affect is full and reactive, and his mood is good overall.
His thinking is logical. He has depressive thoughts, without any active thoughts
to die, but doesn't feel safe out in the world at times. He is not psychotic and
not hallucinating. His insight is fair and judgment is fair. 
 
A: 52 year old man with depressive sx, recent loss of partner, significant 
alcohol use, appears to be improving clinically. 
- T4 marginally below cutoff at 4.3, does not appear to merit med change acutely
, can follow up with internist or PCP. 
- continue current medication doses 
- provided education/support regarding ongoing weight loss and dietary changes 
to reduce DM risk.

## 2018-07-28 NOTE — SOCIAL WORKER PROG NOTE PSYCH
Social Work Progress Note
Progress Note
SW met with pt to finish [facility] social history document. Pt was on the phone with 
his  girlfriend's sister when SW entered the unit. Pt's affect was 
bright, he denies SI as he reports feeling safe in the hospital, and his mood 
overall seemed improved since yesterday when he was in the ED. Pt was dressed in
his own clothes- bright orange tshirt and shorts as he stated he had packed 
clothes with him when he came to the ED. Pt states the rosie came to visit him
today which he appreciated.

## 2018-07-29 VITALS — DIASTOLIC BLOOD PRESSURE: 62 MMHG | SYSTOLIC BLOOD PRESSURE: 117 MMHG

## 2018-07-29 VITALS — DIASTOLIC BLOOD PRESSURE: 72 MMHG | SYSTOLIC BLOOD PRESSURE: 133 MMHG

## 2018-07-29 VITALS — DIASTOLIC BLOOD PRESSURE: 60 MMHG | SYSTOLIC BLOOD PRESSURE: 121 MMHG

## 2018-07-29 VITALS — DIASTOLIC BLOOD PRESSURE: 55 MMHG | SYSTOLIC BLOOD PRESSURE: 102 MMHG

## 2018-07-29 VITALS — SYSTOLIC BLOOD PRESSURE: 133 MMHG | DIASTOLIC BLOOD PRESSURE: 72 MMHG

## 2018-07-29 NOTE — CP SOUTH PROGRESS NOTE PSYCH
Psych (Inpt) Progress Note
Progress Note
Include the following elements, when applicable:
Involvement in the active treatment of the patient with behavioral observations 
of the patient and the patient's response to the treatment.
Review of the ongoing treatment process in the context of the treatment plan.
Indication of how multi-disciplinary staff members are carrying out the 
treatment plan.
Plans for future interventions and recommendations for revision of the treatment
plan.
Liaison with other physicians/providers.
Progress Note:
 
pleasant, cooperative. States that he is sleeping better here compared to at 
home, usually wakes up at 4/5am and has trouble falling back asleep. We 
discussed limiting coffee/tea/chocolate which he does, no phone before bed and 
no TV before bed, he appears to be doing reasonably with sleep hygiene. Still 
has some epigastric pain, chronic constipation - stated that citrate mag is the 
only thing that helps him, he already received GI cocktail to help with 
constipation and ineffective. 
 
Discussed diet/exercise etc which he states he is doing to improve chronic 
constipation, risk of long term constipation on GI system, and to ask his PCP 
about a GI consult when discharged. X
 
Mood is down, states that he is "very much" depressed, thinks about his 
girlfriend frequently, wants to be with her but does not have active plans to 
die or harm himself. Explained normal vs pathological bereavement. Discussed 
looking for bereavement groups. 
 
Low energy, low mood - discussed groups at the [facility] and more socialization. 
 
MSE: pleasant, overweight man, middle aged, well related and well groomed. His 
speech is normal. His affect is full and reactive, and his mood is more down 
today, he appears quieter. His thinking is logical. He has depressive thoughts, 
with passive thoughts of wanting to be with his  girlfriend but no 
active thoughts. feels safe around people in the milieu here.  He is not 
psychotic and not hallucinating. His insight is fair and judgment is fair. 
 
A: 52 year old man with depressive sx, recent loss of partner, alcohol use, with
frequent depressive thoughts, passive thoughts of wanting to be with  
partner, but coping overall well and no behavioral issues on the unit. 
- continue current medication doses 
- discussed letting staff know if depressive thoughts become overwhelming, if in
the world has plan to harm self to call 911, basic safety planning 
- T4 marginally below cutoff at 4.3, does not appear to merit med change acutely
, can follow up with internist or PCP. 
- citrate mag 1/2 bottle, can repeat 2nd 1/2 if ineffective today for 
constipation 
- discussed behavioral activation, sleep hygiene, diet/exercise habits

## 2018-07-30 VITALS — DIASTOLIC BLOOD PRESSURE: 70 MMHG | SYSTOLIC BLOOD PRESSURE: 107 MMHG

## 2018-07-30 VITALS — DIASTOLIC BLOOD PRESSURE: 68 MMHG | SYSTOLIC BLOOD PRESSURE: 110 MMHG

## 2018-07-30 VITALS — SYSTOLIC BLOOD PRESSURE: 138 MMHG | DIASTOLIC BLOOD PRESSURE: 76 MMHG

## 2018-07-30 VITALS — SYSTOLIC BLOOD PRESSURE: 129 MMHG | DIASTOLIC BLOOD PRESSURE: 73 MMHG
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
## 2018-07-30 NOTE — CP SOUTH PROGRESS NOTE PSYCH
Psych (Inpt) Progress Note
Progress Note
I reviewed Dr. Trinidad's notes for the weekend ( and 2018).
 
LCSW, RN, OTR/L, Group and Activities Therapist, and Psychiatrist discussed the 
pt.'s progress, inpatient treatment plan, and aftercare plans.
 
Mental status evaluation:
The patient was alert and oriented to time, place, and person.  He he still 
claims that he is having thoughts of wishing death as well as thoughts of 
suicide.  States that he is sleeping better here compared to at home, usually 
wakes up at 4/5am and has trouble falling back asleep. Mood is down, states that
he is "very much" depressed, thinks about his girlfriend frequently, wants to be
with her but does not have active plans to die or harm himself. Low energy, low 
mood -speech is normal. His affect is full and reactive, thinking is logical. He
has depressive thoughts, with passive thoughts of wanting to be with his 
 girlfriend but no active thoughts. feels safe around people in the 
milieu here.  He is not psychotic and not hallucinating. 
 
Assessment: 
Luca Foreman is a 52 year old man with depressive sx, recent loss of partner, 
alcohol use, with frequent depressive thoughts, passive thoughts of wanting to 
be with  partner, but coping overall well and no behavioral issues on 
the unit. 
 
Treatment plan update:
Reduce Seroquel to 700 mg at bedtime
Continue all other medications unchanged.
 
 
 
active thoughts. feels safe around people in the milieu here.  He is not 
psychotic and not hallucinating. His insight is fair and judgment is fair. 
 
A: 52 year old man with depressive sx, recent loss of partner, alcohol use, with
frequent depressive thoughts, passive thoughts of wanting to be with  
partner, but coping overall well and no behavioral issues on the unit. 
- continue current medication doses 
- discussed letting staff know if depressive thoughts become overwhelming, if in
the world has plan to harm self to call 911, basic safety planning 
- T4 marginally below cutoff at 4.3, does not appear to merit med change acutely
, can follow up with internist or PCP. 
- citrate mag 1/2 bottle, can repeat 2nd 1/2 if ineffective today for 
constipation 
- discussed behavioral activation, sleep hygiene, diet/exercise habits

## 2018-07-31 VITALS — SYSTOLIC BLOOD PRESSURE: 116 MMHG | DIASTOLIC BLOOD PRESSURE: 71 MMHG

## 2018-07-31 VITALS — DIASTOLIC BLOOD PRESSURE: 74 MMHG | SYSTOLIC BLOOD PRESSURE: 113 MMHG

## 2018-07-31 VITALS — SYSTOLIC BLOOD PRESSURE: 140 MMHG | DIASTOLIC BLOOD PRESSURE: 69 MMHG

## 2018-07-31 NOTE — CP SOUTH PROGRESS NOTE PSYCH
Psych (Inpt) Progress Note
Progress Note
MILAGROSW, RN, OTR/L, Group and Activities Therapist, and Psychiatrist discussed the 
pt.'s progress, inpatient treatment plan, and aftercare plans.
 
Vital Signs
 
 
Date Time Temp Pulse Resp B/P B/P O2
 
 1213  79  140/69  
 
 0802 97.7 78  116/71  
 
 0740 97.7 78  116/71  
 
7 98.0 80 16 129/73  
 
 1941 98.0 80  129/73  
 
 1925 97.2 73 16 138/76  
 
 1556  73  138/76  
 
 
Mental Status Evaluation:
Pt. was alert and oriented to time, place, and person. He reported that he is 
still wishing death. Denied thoughts of suicide today. He is back to having 
trouble with sleep interruption/falling back asleep. Mood is "very depressed," 
low energy, low mood -speech is normal. His affect is full and reactive, 
thinking is logical. He is not psychotic and not hallucinating. 
 
Assessment Update: 
The patient is a 52-year-old Single White man with frequent depressive thoughts,
passive thoughts of wanting to be with  partner, but coping overall well
and no behavioral issues on the unit. 
 
Treatment Plan Update:
Change clonazepam to 1mg in AM and 1 mg at bedtime
Continue Seroquel 700 mg at bedtime
Continue all other medications unchanged.
 
 
 
Reduce Seroquel to 700 mg at bedtime
Continue all other medications unchanged.

## 2018-07-31 NOTE — SOCIAL WORKER PROG NOTE PSYCH
**See Addendum**
Social Work Progress Note
Progress Note
This writer met with patient.  He described his mood as "not great" and 
complained of having racing thoughts.  He denied any SI at the time of this 
conversation (10:55am), however reported having SI this morning and yesterday 
evening.  Dr. Gharaibeh joined the conversation and the patient reproted that he
had not slept well last night.  Dr. Gharaibeh and the patient discussed 
medications, including questions and concerns.
 
Patient was not willing to sign ESTHER's for his  girlfriend's family, 
however, was agreeable to signing a ESTHER for his cousin, Callie, and scheduling 
a family meeting with her (by phone as she lives out of state).  Patient also 
signed ESTHER's for his psychiatrist Dr. Haley and [facility].
 
1:56pm
This writer left  with clinical for Chasity at [facility] ([phone], [phone]. [phone])
.  She returned the call and authorized additional days with next review due on 
18.
 
4:54pm
After being informed by the patient that he had spoken with his cousin (Callie Corey) and informed her of this hospitalization, and that she could be called,
this writer left a  for DEVENDRA Corey requesting a call back.  A call back number
was provided.
 
4:56pm
This writer left  for [facility] ([phone]) requesting a call back 
in interest of making a referral.  A call back number was provided.

## 2018-08-01 VITALS — SYSTOLIC BLOOD PRESSURE: 112 MMHG | DIASTOLIC BLOOD PRESSURE: 70 MMHG

## 2018-08-01 VITALS — DIASTOLIC BLOOD PRESSURE: 68 MMHG | SYSTOLIC BLOOD PRESSURE: 118 MMHG

## 2018-08-01 NOTE — SOCIAL WORKER PROG NOTE PSYCH
Social Work Progress Note
Progress Note
11:41am
This writer left a vm for Callie Corey in response to her vm.  Efforts to 
reach her are being made in interest of scheduling a family meeting.
 
This writer met with patient.  He reported that he did not have an SI today and 
described his mood as "alright, not bad" with general/overall improvement.  He 
stated that he also slept well last night.  Patient inquired about the referral 
to [facility] and was informed that a vm had been left for their office. 
He stated that he would like to keep his appointment with Dr. Haley and believes 
to have an appointment between 8/21 and 8/25/18.
 
This writer spoke with [facility] by phone ([phone]). During the 
referral process it was learned that they do not accept the patient's insurance 
as his [employer] C is QMB.  Other outpatient treatment providers will be explored.

## 2018-08-01 NOTE — CP SOUTH PROGRESS NOTE PSYCH
Psych (Inpt) Progress Note
Progress Note
MILAGROSW, RN, OTR/L, Group and Activities Therapist, and Psychiatrist discussed the 
pt.'s progress, inpatient treatment plan, and aftercare plans.
 
Vital Signs:
Vital Signs
 
 
Date Time Temp Pulse B/P B/P Pulse O2 FiO2
 
 0743 97.8 74 118/68    
 
2127 98.6 80 113/74    
 
1953 98.6 80 113/74    
 
 1935  79 140/69    
 
 
Mental Status:
The pt. was alert and oriented to time, place, and person. He reported that he 
slept well last night. He feels slightly better but still, on-off, wishing 
death. Denied active thoughts, plans, urges of suicide today. Mood is "depressed
," as opposed to "very depressed" yesterday. His affect is less constricted/
slightly more animated.
He continued to report low energy, low motivation, low pleasure. 
Speech is normal. 
thinking is logical. He is not psychotic and not hallucinating. 
 
Assessment Update: 
The patient is a 52-year-old Single White Male who was admitted due to passive 
thoughts of wanting to be with  partner, 
Since his admission on ..., he has been showing very slow improvement but no 
behavioral issues on the unit. 
 
Treatment Plan Update:
Change Seroquel to 100 mg Q14:00 hours and 600 mg at bedtime
Continue all other medications unchanged.
 
 
Atenolol 50 MG 
 
  PO 
 
Atorvastatin Calcium 40 MG  1700
 
  PO 
 
Clonazepam 1 MG  0800
 
  PO  0759
 
Clonazepam 1 MG AT BEDTIME  2100
 
  PO  2059
 
Docusate Sodium 100 MG TID  1400
 
  PO 
 
Famotidine 20 MG DAILY  0950
 
  PO 
 
Fluoxetine HCl 80 MG DAILY  0950
 
  PO 
 
Gabapentin 600 MG TID  2100
 
  PO 
 
Levothyroxine Sodium 0.05 MG DAILY AC  0958
 
  PO 
 
Magnesium Hydroxide 30 ML AT BEDTIME PRN  1215
 
  PO 
 
Metformin HCl 500 MG 0800, 1700
 
  PO 
 
Prazosin HCl 4 MG AT BEDTIME  2100
 
Quetiapine Fumarate 600 MG 
 
  PO 
 
Quetiapine Fumarate 100 MG 1500  1500
 
  PO 
 
Temazepam 30 MG AT BEDTIME  2100
 
  PO

## 2018-08-02 VITALS — SYSTOLIC BLOOD PRESSURE: 103 MMHG | DIASTOLIC BLOOD PRESSURE: 53 MMHG

## 2018-08-02 VITALS — DIASTOLIC BLOOD PRESSURE: 70 MMHG | SYSTOLIC BLOOD PRESSURE: 136 MMHG

## 2018-08-02 NOTE — SOCIAL WORKER PROG NOTE PSYCH
Social Work Progress Note
Progress Note
This writer met with patient.  He was informed that [facility] does not 
accept his insurance.  He stated that he spoke with [facility] previously 
and that they also do not accept his insurance.  Patient described his mood as 
"not too bad" and is hopefuly about starting the Lithium today.  He reports that
SI "comes and goes" and that he tends to experience it in the later afternoons 
and evenings.  This writer and patient discussed strategies to manage the SI, to
which he responded that he spends time with peers on the unit.  Patient stated 
that he is working with Dr. Gharaibeh to adjust medications to address his 
symptoms, especially as they worsen later in the day.  He reported his 
depression and anxiety both at 8/10 (with 10 being the worst).  He reported that
he experiences "paranoia alot" related to his childhood trauma.  He also reports
struggling with grief over the loss of his grilfriend and feels that individual 
therapy might be helpful in addressing his trauma and grief.
 
2:04pm
This writer spoke with Chasity at [facility] ([phone]. [phone]) for the 
concurrent review.  Additional days were authorized with next review due on 8/6/
18.  Chasity provided providers who the patient could be referred.
 
This writer spoke with patient following the call with Chasity.  He was agreeable
to a referral to [facility].  We also called his cousing, Callie Leora, and a 
meeting has been scheduled for 10:30am tomorrow, 8/3/18.

## 2018-08-02 NOTE — CP SOUTH PROGRESS NOTE PSYCH
Psych (Inpt) Progress Note
Progress Note
Vital Signs:
 
 
Date Time Temp Pulse B/P B/P Pulse O2 FiO2
 
803 97.2 79 103/53    
 
2003  82 112/70    
 
Mental Status:
Luca was alert and oriented to time, place, and person. He reported that he 
slept well last night. He feels slightly better/less depressed. He has on-off 
wishes of death. Denied active thoughts, plans, urges of suicide today. Mood is 
"depressed," but his affect is less constricted/slightly more animated.
Luca reported that he still struggles with low energy, low motivation, low 
pleasure. 
Speech is normal. 
Luca's thinking is logical. He is not psychotic and not hallucinating. 
 
Assessment Update: 
Luca Foreman is a 52-year-old Single White Male who was admitted due to passive
thoughts of wanting to be with  partner, 
Since his admission on ..., he has been showing very slow improvement but no 
behavioral issues on the unit. 
 
Treatment Plan Update:
Start Lithium 150 mg BID
Continue Seroquel 100 mg Q15:00 hours and 600 mg at bedtime
Continue Atenolol 50 MG 
Atorvastatin Calcium 40 MG  1700 
Clonazepam 1 MG  08 0759
Clonazepam 1 MG AT BEDTIME  2059
Docusate Sodium 100 MG TID  1400 
Famotidine 20 MG DAILY  0950 
Fluoxetine HCl 80 MG DAILY  0950 
Gabapentin 600 MG TID  2100 
Levothyroxine Sodium 0.05 MG DAILY AC  0958 
Metformin HCl 500 MG 0800, 1700 
Prazosin HCl 4 MG AT BEDTIME  2100
Temazepam 30 MG AT BEDTIME  2100
 
 
 
  PO 
Gabapentin 600 MG TID  2100
  PO 
Levothyroxine Sodium 0.05 MG DAILY AC  0958
  PO 
Magnesium Hydroxide 30 ML AT BEDTIME PRN  1215
  PO 
Metformin HCl 500 MG 0800, 1700
  PO 
Prazosin HCl 4 MG AT BEDTIME  2100
Quetiapine Fumarate 600 MG 
  PO 
Quetiapine Fumarate 100 MG 1500  1500
  PO 
Temazepam 30 MG AT BEDTIME  2100
  PO

## 2018-08-03 VITALS — SYSTOLIC BLOOD PRESSURE: 113 MMHG | DIASTOLIC BLOOD PRESSURE: 78 MMHG

## 2018-08-03 VITALS — SYSTOLIC BLOOD PRESSURE: 116 MMHG | DIASTOLIC BLOOD PRESSURE: 64 MMHG

## 2018-08-03 NOTE — SOCIAL WORKER PROG NOTE PSYCH
Social Work Progress Note
Progress Note
Dr. Garaibeh, Kendra King (PA student) and this writer met with patient.  His 
cousin, Callie, attended the meeting by phone.  She expressed safety concerns 
in reference impulsive behaviors, however, did not have any concerns that the 
patient would intentially harm himself.  She stated that they speak regularly (
almost daily) and the patient had made comments regarding it being easier to die
and be with her (patient's  girlfriend."  She expressed concerns about 
the patient having minimal social contacts and would like to see him engage in 
more activities such as classes or attending [employer].  Anticipated discharge date
was discussed as 18.  Callie was informed that this writer is exploring 
outpatient treatment providers and has had difficulties with identifying a 
provider that accepts his insurance.  Callie stated that she hopes that the 
patient will be able to visit her and spend some time with her.  Callie thanked
us for the call and Patient will up Callie as discharge plans develop.
 
2:51pm
This writer attempted to reach [facility] again to follow up on the previous vm 
from yesterday.  It was unclear if this writer had received a return call from 
them earlier today as the caller did not identify where they were calling from 
and the call back number was difficult to understand.

## 2018-08-03 NOTE — CP SOUTH PROGRESS NOTE PSYCH
Psych (Inpt) Progress Note
Progress Note
The pt.'s progress, inpatient treatment plan, and aftercare plans were discussed
in the treatment planning meeting (team members: Jackeline Robins, HERO, RN, OTR/L, and 
Psychiatrist)
 
Mental Status:
Luca was alert and oriented to time, place, and person. He reported that he 
slept well last night. He feels slightly better/less depressed. He has on-off 
wishes of death. Denied active thoughts, plans, urges of suicide today. Mood is 
"depressed," but his affect is less constricted/slightly more animated. Luca 
reported that he still struggles with low energy, low motivation, low pleasure. 
Speech is normal. Luca's thinking is logical. He is not psychotic and not 
hallucinating. 
 
Assessment Update: 
Luca Foreman is a 52-year-old Single White Male who was admitted due to passive
thoughts of wanting to be with  partner, 
Since his admission on ..., he has been showing very slow improvement but no 
behavioral issues on the unit. 
 
Treatment Plan Update:
Increase Lithium to 300 mg BID
Continue all other meds unchanged
Lab monitoring on Monday morning

## 2018-08-04 VITALS — DIASTOLIC BLOOD PRESSURE: 82 MMHG | SYSTOLIC BLOOD PRESSURE: 142 MMHG

## 2018-08-04 VITALS — SYSTOLIC BLOOD PRESSURE: 106 MMHG | DIASTOLIC BLOOD PRESSURE: 67 MMHG

## 2018-08-04 NOTE — CP SOUTH PROGRESS NOTE PSYCH
Psych (Inpt) Progress Note
Progress Note
Pt notes that his sleep was quite poor overnight. Does not want changes made. 
Plans to spend much of day in bed. Denies SI or HI.
 
 Current Medications
 
 
  Sig/William Start time  Last
 
Medication Dose Route Stop Time Status Admin
 
Acetaminophen 650 MG .STK-MED ONE 08/03 2143 DC 
 
  PO 08/03 2144  
 
Acetaminophen 650 MG Q4P PRN 07/31 1215 AC 08/04
 
  PO   0446
 
Al Hydroxide/Mg  30 ML Q4-6 PRN PRN 07/31 1215 AC 
 
Hydroxide  PO   
 
Aspirin 81 MG DAILY 07/27 0951 AC 08/04
 
  PO   0826
 
Atenolol 50 MG 2000 07/27 2000 AC 08/03
 
  PO   2044
 
Atorvastatin Calcium 40 MG 1700 07/27 1700 AC 08/03
 
  PO   1637
 
Clonazepam 1 MG 0800 08/01 0800 AC 08/04
 
  PO 08/08 0759  0827
 
Clonazepam 1 MG AT BEDTIME 07/31 2100 AC 08/03
 
  PO 08/07 2059  2044
 
Docusate Sodium 100 MG TID 08/01 1400 AC 08/04
 
  PO   0826
 
Famotidine 20 MG DAILY 07/27 0950 AC 08/04
 
  PO   0827
 
Fluoxetine HCl 80 MG DAILY 07/27 0950 AC 08/04
 
  PO   0826
 
Gabapentin 600 MG TID 07/27 2100 AC 08/04
 
  PO   0827
 
Levothyroxine Sodium 0.05 MG DAILY AC 07/27 0958 AC 08/04
 
  PO   0649
 
Lithium Carbonate 300 MG BID 08/03 0900 AC 08/04
 
  PO   0826
 
Magnesium Hydroxide 30 ML AT BEDTIME PRN 07/31 1215 AC 08/02
 
  PO   1131
 
Metformin HCl 500 MG 0800,1700 07/27 1700 AC 08/04
 
  PO   0826
 
Prazosin HCl 4 MG AT BEDTIME 07/27 2100 AC 08/03
 
  PO   2044
 
Quetiapine Fumarate 600 MG 2000 08/01 2000 AC 08/03
 
  PO   2142
 
Quetiapine Fumarate 100 MG 1500 08/01 1500 AC 08/02
 
  PO   1500
 
Temazepam 30 MG AT BEDTIME 07/28 2100 AC 08/03
 
  PO   2222
 
 
Vital Signs
 
 
Date Time Temp Pulse Resp B/P B/P Pulse O2 O2 Flow FiO2
 
     Mean Ox Delivery Rate 
 
08/04 0737 96.6 63  106/67     
 
08/03 2044  84  113/78     
 
08/03 2044  84  113/78     
 
08/03 1933 99.7 84  113/78     
 
 
 
MSE
Appearance: as stated age
Speech :  nl rate, rhythm, volume and prosody  
Behavior: cooperative
Motor:  + psychomotor retardation
Mood : not good
Affect : flat, non-labile, irritable, appropriate, constricted
Thought process:  linear and goal directed
Thought content : no delusions or paranoia
Perceptions: denied AVHs, denied SI or HI
Insight: poor
Judgment:  poor
 
A/P: Pt with MDD and hx of AUD now with improved mood though continues struggles
with sleep. 
 
- Continue current medication regimen

## 2018-08-05 VITALS — DIASTOLIC BLOOD PRESSURE: 72 MMHG | SYSTOLIC BLOOD PRESSURE: 136 MMHG

## 2018-08-05 VITALS — SYSTOLIC BLOOD PRESSURE: 104 MMHG | DIASTOLIC BLOOD PRESSURE: 47 MMHG

## 2018-08-05 NOTE — CP SOUTH PROGRESS NOTE PSYCH
Psych (Inpt) Progress Note
Progress Note
Pt notes ongoing tooth pain. Does have appt to get pulled next next week. Denies
SI or HI. 
 
 Current Medications
 
 
  Sig/William Start time  Last
 
Medication Dose Route Stop Time Status Admin
 
Acetaminophen 650 MG .STK-MED ONE 08/04 1844 DC 
 
  PO 08/04 1845  
 
Acetaminophen 650 MG Q4P PRN 07/31 1215 AC 08/05
 
  PO   0829
 
Al Hydroxide/Mg  30 ML Q4-6 PRN PRN 07/31 1215 AC 
 
Hydroxide  PO   
 
Aspirin 81 MG DAILY 07/27 0951 AC 08/05
 
  PO   0827
 
Atenolol 50 MG 2000 07/27 2000 AC 08/04
 
  PO   2128
 
Atorvastatin Calcium 40 MG 1700 07/27 1700 AC 08/04
 
  PO   1652
 
Benzocaine 1 LATHA Q4P PRN 08/04 2045 AC 08/05
 
  TOP   0552
 
Clonazepam 1 MG 0800 08/01 0800 AC 08/05
 
  PO 08/08 0759  0829
 
Clonazepam 1 MG AT BEDTIME 07/31 2100 AC 08/04
 
  PO 08/07 2059 2128
 
Docusate Sodium 100 MG TID 08/01 1400 AC 08/05
 
  PO   0827
 
Famotidine 20 MG DAILY 07/27 0950 AC 08/05
 
  PO   0827
 
Fluoxetine HCl 80 MG DAILY 07/27 0950 AC 08/05
 
  PO   0827
 
Gabapentin 600 MG TID 07/27 2100 AC 08/05
 
  PO   0827
 
Levothyroxine Sodium 0.05 MG DAILY AC 07/27 0958 AC 08/05
 
  PO   0552
 
Lithium Carbonate 300 MG BID 08/03 0900 AC 08/05
 
  PO   0827
 
Magnesium Hydroxide 30 ML AT BEDTIME PRN 07/31 1215 AC 08/02
 
  PO   1131
 
Metformin HCl 500 MG 0800,1700 07/27 1700 AC 08/05
 
  PO   0827
 
Prazosin HCl 4 MG AT BEDTIME 07/27 2100 AC 08/04
 
  PO   2128
 
Quetiapine Fumarate 600 MG 2000 08/01 2000 AC 08/04
 
  PO   2128
 
Quetiapine Fumarate 100 MG 1500 08/01 1500 AC 08/02
 
  PO   1500
 
Temazepam 30 MG AT BEDTIME 07/28 2100 AC 08/04
 
  PO   2213
 
Tramadol HCl 50 MG Q8P PRN 08/05 1230 UNVr 
 
  PO 08/06 0900  
 
 
Vital Signs
 
 
Date Time Temp Pulse Resp B/P B/P Pulse O2 O2 Flow FiO2
 
     Mean Ox Delivery Rate 
 
08/05 0822 96.7 68  104/47     
 
08/04 2128  82  142/82     
 
08/04 2128  82  142/82     
 
08/04 1954 99.1 82  142/82     
 
 
 
MSE
Appearance: as stated age
Speech :  nl rate, rhythm, volume and prosody  
Behavior: cooperative
Motor:  + psychomotor retardation
Mood : my tooth
Affect : flat, non-labile, irritable, appropriate, constricted
Thought process:  linear and goal directed
Thought content : no delusions or paranoia
Perceptions: denied AVHs, denied SI or HI
Insight: poor
Judgment:  poor
 
A/P: Pt with MDD and hx of AUD now with improved mood though continues struggles
with sleep. 
 
- Continue current medication regimen except added tramadol 50mg q8P for next 
24hrs

## 2018-08-06 VITALS — SYSTOLIC BLOOD PRESSURE: 128 MMHG | DIASTOLIC BLOOD PRESSURE: 62 MMHG

## 2018-08-06 VITALS — DIASTOLIC BLOOD PRESSURE: 56 MMHG | SYSTOLIC BLOOD PRESSURE: 93 MMHG

## 2018-08-06 LAB — LITHIUM SERPL-SCNC: 0.3 MMOL/L (ref 0.6–1.2)

## 2018-08-06 NOTE — SOCIAL WORKER PROG NOTE PSYCH
Social Work Progress Note
Progress Note
3:15pm: This writer left  carlos Gaspar at [facility] ([phone], ext. [phone]) for 
the concurrent review, requesting ongoing authorization for inpatient stay.

## 2018-08-06 NOTE — CP SOUTH PROGRESS NOTE PSYCH
Psych (Inpt) Progress Note
Progress Note
I reviewed Dr. Mayo's notes for the weekend of  and 2018.
 
The pt.'s progress, inpatient treatment plan, and aftercare plans were discussed
in the treatment planning meeting (team members: Jackeline Robins LCSW; Jazmine Nolan, 
RN; OTR/L, and Psychiatrist)
 
Mental Status:
Luca reported that he does not feel ready for discharge because he was still 
having "dark, negative thoughts over the weekend" (i.e. on-off wishes of death 
and thoughts of suicide, but denied active plans or intent, "I feel safe here").
He was alert and oriented to time, place, and person. He reported that he slept 
well last night. Mood is still "depressed," but his affect is less constricted/
slightly more animated. Luca reported that he still struggles with low energy, 
low motivation, low pleasure. Speech is normal. Luca's thinking is logical. He 
does not have delusions, thought doisorder, and not hallucinating. 
 
Assessment Update: 
Luca Foreman is a 52-year-old Single White Male who was admitted due to passive
thoughts of wanting to be with his  girlfriend. 
Since his admission on 2018, he has been showing very slow improvement and 
no behavioral issues on the unit. He continues to claim episodic wishes of death
and thoughts of suicide
 
Treatment Plan Update:
Increase Lithium to 300 mg BID
Continue all other meds unchanged

## 2018-08-06 NOTE — SOCIAL WORKER PROG NOTE PSYCH
Social Work Progress Note
Progress Note
This writer joined the conversation between Dr. Gharaibeh and the patient.

## 2018-08-07 VITALS — DIASTOLIC BLOOD PRESSURE: 68 MMHG | SYSTOLIC BLOOD PRESSURE: 107 MMHG

## 2018-08-07 NOTE — DISCHARGE SUMMARY REPORT-PSYCH
Visit Information
 
Visit Dates/Diagnosis'
Admission Date:
18
 
Discharge Date: 18
Reason for Admission:
thoughts of suicide
Psy Discharge Primary Diag: Unspecified Depressive DO
Psy Discharge Secondary Diag: Obsessive Compulsive DO
 
Hospital Course
Significant Lab Findings:
 Lab
 
 
Cholesterol 175 MG/DL 18 0620
 
Cholesterol/HDL Ratio 5 % H 18 0620
 
HDL Cholesterol 37 mg/dL L 18 0620
 
Hemoglobin A1c 6.2 % H 18 0620
 
LDL Cholesterol, Calc 107 mg/dL 18 0620
 
TSH 6.480 uIU/mL H 18 0600
 
Thyroxine (T4) 4.3 ug/dL L 18 0620
 
Triglycerides 159 mg/dL H 18 0620
 
 
 
Course
Complications:
Patient did not have any complications while he was on the inpatient psychiatric
unit.
Consultations:
Patient had a history and physical examination while he was on the inpatient 
psychiatric unit.  Please refer to the patient's electronic health record for 
the details of the H&P.
Allergies:
Coded Allergies:
No Known Allergies (18)
 
Hospital Course/TX Response:
2018:
Impression and Plan:
52-year-old single white male who was admitted because of increasing depression 
and thoughts of suicide.
- Include all active medical diagnosis that require tx
DSM 5 Diagnosis(es):
Unspecified depressive disorder
Obsessive-compulsive disorder
Posttraumatic stress disorder
Alcohol use disorder
- Initial Tx Plan for Active Psych & Medical Conditions
Treatment Plan:
Inpatient psychiatric care with safety checks every 15 minutes
Resume all medications as per his outpatient providers at [facility]'s intensive 
outpatient program.
 
2018:
Dr. Trinidad's Assessment:
52-year old man with depressive sx, recent loss of partner, significant alcohol 
use, appears to be improving clinically. 
- T4 marginally below cutoff at 4.3, does not appear to merit med change acutely
, can follow up with internist or PCP. 
- continue current medication doses 
- provided education/support regarding ongoing weight loss and dietary changes 
to reduce DM risk.
 
Over the next several days, the patient's treatment plan remained mostly 
unchanged, with changes only in the scheduling and dosing of Klonopin.  There 
was also gradual reductions in the dose of Seroquel.  The patient tolerated the 
changes very well.
The patient's report of passive wishes of death as well as on and off thoughts 
of suicide was thought to be mostly malingered with the patient's intention 
being to prolong his hospitalization as long as possible.
 
The day of discharge was 2018
The patient's mental Status:
Luca continues to bargain for more time but seems very much at home in inpatient
settings (this may have been his 11th inpatient psychiatric admission). Denied 
thoughts of suicde, still vague and evasive about his claims of "dark, negative 
thoughts" about wishing death (this part may be embellished, exaggerated/
malingered to bargain for more time).
He was alert and oriented to time, place, and person. He reported that he slept 
well last night. Mood is still "depressed," but his affect is less constricted/
slightly more animated. Luca reported that he still struggles with low energy, 
low motivation, low pleasure. Speech is normal. 
Luca's thinking is logical. He does not have delusions, thought doisorder, and 
not hallucinating. 
Assessment Update: 
Luca Foreman is a 52-year-old Single White Male who was admitted due to passive
thoughts of wanting to be with his  girlfriend. 
Since his admission on 2018, he has been showing very slow improvement and 
no behavioral issues on the unit. He continues to claim episodic wishes of death
but has denied thoughts of suicide yesterday and today.
Treatment Plan Update:
D/C Home to follow up with OPS
 
 
 
Discharge HBIPS
- Tobacco Use Treatment Offered
Post DC Medications Offered: Refused Tob Medication Tx
Post DC Tobacco Treatment Plan: Refused Tobacco Tx Pgm
- EtOH/Drug Use D/O Treatment Offered
Post DC Medications Offered: NA-No EtOH/Drug Use D/O
Post DC EtOH/SubAbuse TX Plan: NA-No EtOH/Drug Use D/O
 
Metabolic Screening
- Screen if on a Neuroleptic Medication
- Metabolic screening should include:
- Blood Pressure, BMI, Glucose or Hgb A1c, & a
- Lipid profile from within the past 365 days.
Metabolic Screening
Patient on a neuroleptic(s) .
     Enter below results for Hemoglobin A1C, 
     and lipid panel if obtained during the last 365 days.
 
BMI: 36.000     
Blood Pressure: 107/68
Laboratory Results From [facility] (If applicable):
 Lab
 
 
Cholesterol 175 MG/DL 18 0620
 
Cholesterol/HDL Ratio 5 % H 18 0620
 
HDL Cholesterol 37 mg/dL L 18 0620
 
Hemoglobin A1c 6.2 % H 18 0620
 
LDL Cholesterol, Calc 107 mg/dL 18 06
 
TSH 6.480 uIU/mL H 18 0600
 
Thyroxine (T4) 4.3 ug/dL L 18 0620
 
Triglycerides 159 mg/dL H 18 0620
 
 
 
 
Discharge Instructions
 
General Discharge Information
Multiple Neuroleptics:
 Not Applicable
  
 
Discharge Diet Diabetic
Discharge Activity Normal
DC Disposition:
Home
Referrals
Ordered Referrals
Outpatient Psychiatry 18
[address] [place], [place] [id number]
[phone]
 
[address]  
[phone]  
  
Intake appointment:  
Tuesday, 18, at 12:30pm with   
Megan NIELSEN LCSW
 
Outpatient Psychiatry 18
[address] [place], [place] [id number]
[phone]
 
[address]  
[phone]  
  
Medication evaluation:  
Tuesday, 18, at 1pm with Dr. Bailey
 
Provider Referral
For Groups:
[[facility] (formerly [facility]
 
[address]  
[phone]  
  
Patient is currently on their wait list 
and will be contacted for an 
appointment.  Patient may follow up as 
well.
 
 
 
Prescriptions
Stop taking the following medications:
Metformin HCl (Metformin HCl) 500 MG TABLET ORAL Every Morning 
 
Continue taking these medications:
Atenolol (Atenolol) 50 MG TABLET
    1 Tablet ORAL TAKE AT BEDTIME
    Comments:
       Last Taken:18
             Time:9:30PM
 
Prazosin HCl (Prazosin HCl) 2 MG CAPSULE
    2 Capsule ORAL TAKE AT BEDTIME
    Comments:
       Last Taken:18
             Time:9:30
 
Temazepam (Restoril) 30 MG CAPSULE
    1 Capsule ORAL TAKE AT BEDTIME
    Comments:
       Last Taken:18
             Time:10PM
 
Gabapentin (Gabapentin) 600 MG TABLET
    1 Tablet ORAL THREE TIMES DAILY
    Comments:
       Last Taken:18
             Time:8AM
 
Fluoxetine HCl (Fluoxetine HCl) 40 MG CAPSULE
    2 Capsule ORAL Every Morning
    Comments:
       Last Taken:18
             Time:8AM
 
Levothyroxine Sodium (Levoxyl) 50 MCG TABLET
    1 Tablet ORAL DAILY BEFORE BREAKFAST
    Comments:
       Last Taken:18
             Time:7AM
 
Atorvastatin Calcium (Lipitor) 40 MG TABLET
    1 Tablet ORAL Every night
    Comments:
       Last Taken:18
             Time:5PM
 
Aspirin (Ecotrin*) 81 MG TABLET.DR
    1 Tablet ORAL Every Morning
    Comments:
       Last Taken:18
             Time:8AM
 
Start taking the following new medications:
Tramadol HCl (Tramadol HCl) 50 MG TABLET
    50 Milligram ORAL EVERY 8 HOURS AS NEEDED as needed for tooth pain
    Qty = 21
    No Refills
    Comments:
       Last Taken:18
             Time:9AM
 
Docusate Sodium (Docusate Sodium) 100 MG CAPSULE
    100 Milligram ORAL THREE TIMES DAILY
    Qty = 45
    No Refills
    Comments:
       Last Taken:18
             Time:8AM
 
Metformin Hydochloride (Glucophage) 500 MG TABLET
    500 Milligram ORAL 0800,1700
    Qty = 60
    No Refills
    Comments:
       Last Taken:18
             Time:8AM
 
Famotidine (Famotidine) 20 MG TABLET
    20 Milligram ORAL DAILY
    Qty = 30
    Refills = 2
    Comments:
       Last Taken:18
             Time:8AM
 
Lithium Carbonate (Lithium Carbonate ER) 450 MG TABLET.ER
    1 Tablet ORAL TWICE DAILY
    Qty = 60
    Refills = 2
    Comments:
       Last Taken:18
             Time:8AM
 
The following medications have been changed:
Old:
Quetiapine Fumarate (Seroquel) 400 MG TABLET
    2 Tablet ORAL TAKE AT BEDTIME
 
New:
Quetiapine Fumarate (Seroquel) 400 MG TABLET
    1.5 Tablet ORAL TAKE AT BEDTIME
    Qty = 1
    Comments:
       Last Taken:18
             Time:9:30
 
Old:
Clonazepam (Clonazepam) 1 MG TABLET
    1 Tablet ORAL TWICE DAILY
 
New:
Clonazepam (Clonazepam) 1 MG TABLET
    1 Tablet ORAL SEE INSTRUCTIONS
    Qty = 1
    Instructions:
       1/2 tab in AM and 1 tab at bedtime
    Comments:
       Last Taken:18
             Time:8AM
 
 
Studies Pending at Discharge
none
Copies To:
Pavithra Moon LCSW

## 2018-08-07 NOTE — SOCIAL WORKER PROG NOTE PSYCH
**See Addendum**
Social Work Progress Note
Progress Note
This writer called the following clinicians (names provided by his insurance 
company):
- Nakita Farias LCSW ([phone]) with [facility].  Delmy (office
manager) returned the call and stated that Nakita is no longer accepting Medicare. 
Patient was informed of the out of pocket fee ($60-$70), which he stated that he
cannot afford
-Jean Talbot LCSW: vm left at 10:20am ([phone])
-Nakita Corona LCSW: not providing individual therapy
-[facility]: this writer spoke with Carlita at 11:10am.  Patient is placed on their 
wait list anticipating an opening in November, however, appointment cannot be 
made at this time and patient will be contacted.  Patient may also follow up
 
This writer met with patient.  Patient was offered an intake with [facility] for 
tomorrow at 1:15pm, however, declined due to travel time and distance.  He 
identified barriers related to travel/transportation regardless of when the IOP 
intake or groups would meet.  Due to unsuccessful attempts to identify an 
individual therapist close to patient's home, patient requested a referral to 
OPS.  He will speak with the intake clinician about outpatient group therapy 
that is offered.  He accepted the intake for 8/14/18 at 12:30pm with Megan STUBBS LCSW and the medication evaluation with Dr. Bailey on 9/18/18 at 1pm.  Patient 
was informed that he must attend the intake appointment in order to keep the 
appointment with Dr. Bailey.  He agreed and will contact the OPS office if there 
is a conflict with the appointment(s).
 
Patient denied SI/HI/hallucinations and felt safe discharging today.  He 
identified a safety plan to "come back here" and accepted the crisis numbers and
warm lines upon discharge.  He stated that he did not need any additional 
referrals or appointments.  A Lyft ride was scheduled for the patient for 
transportation home today.
Faxed Referral(s)
   Referred To:  OPS
   Transition of Care Documents sent: Health Summary
   Faxed to:  [facility]
   Fax #: [phone]
   Faxed by: SUSAN Robins LCSW
   Date faxed: 08/07/18
   Time Faxed: [phone]

## 2018-08-07 NOTE — PATIENT DISCHARGE INSTRUCTIONS
Psych Discharge Inst
 
General Discharge Information
Reason for Admission:
thoughts of suicide
Psy Discharge Primary Diag+ Unspecified Depressive DO
Psy Discharge Secondary Diag+ Obsessive Compulsive DO
Summary Tests/Major Procedures
 Lab
 
 
Hct 39.8 % L 07/26/18 2140
 
Hgb 13.1 G/DL L 07/26/18 2140
 
 
Studies Pending at DC:
none
 
Patient Instructions
Contact Information
Your Psychiatrist on [facility] was Gharaibeh MD,Numan
 
* If you are experiencing an emergency related to this hospitalization, please 
call [phone] to contact the treating psychiatrist or the psychiatrist-on-
call.
 
* To Request a copy of your medical records, please contact the Medical Records 
Department at [phone].
 
* To request results of studies pending at the time of discharge, please call 
[phone].
 
* Continue your Medications until directed to stop by your Healthcare provider.
 
General Medication Information
Please continue to take your new medications and your continued home medications
, unless otherwise indicated on your discharge medication list, or unless 
directed by your MD or APRN to stop them.
 
 
Special Instructions
Diet Diabetic
Activity Normal
- Tobacco Use Treatment Offered
Post DC Medications Offered: Refused Tob Medication Tx
Post DC Tobacco Treatment Plan: Refused Tobacco Tx Pgm
- EtOH/Drug Use D/O Treatment Offered
Post DC Medications Offered: NA-No EtOH/Drug Use D/O
Post DC EtOH/SubAbuse TX Plan: NA-No EtOH/Drug Use D/O
 
Advance Directives
Does the Patient have Medical Advance Directives No/Refused further info
Does Pt have Psychiatric Advance Directives? No/Refused further info
Does Patient have a Designated Surrogate Decision Maker: No
Information About Psychiatric Advance Directives Provided? Refused
 
Discharge Plan
Post Hospital Treatment Plan:
[facility]

## 2018-08-07 NOTE — CP SOUTH PROGRESS NOTE PSYCH
Psych (Inpt) Progress Note
Progress Note
The pt.'s progress, inpatient treatment plan, and aftercare plans were discussed
in the treatment planning meeting (team members: Jackeline Robins LCSW; Jazmine Nolan, 
RN; OTR/L, and Psychiatrist)
 
Vital Signs
 
 
Date Time Temp Pulse Resp B/P
 
 0734 96.0 73  107/68
 
 2129 99.3 84 16 128/62
 
Mental Status:
Luca continues to bargain for more time but seems very much at home in inpatient
settings (this may have been his 11th inpatient psychiatric admission).
Denied thoughts of suicde, still vague and evasive about his claims of "dark, 
negative thoughts" about wishing death (this part may be embellished, 
exaggerated/malingered to bargain for more time).
He was alert and oriented to time, place, and person. He reported that he slept 
well last night. Mood is still "depressed," but his affect is less constricted/
slightly more animated. Luca reported that he still struggles with low energy, 
low motivation, low pleasure. Speech is normal. 
Luca's thinking is logical. He does not have delusions, thought doisorder, and 
not hallucinating. 
 
Assessment Update: 
Luca Foreman is a 52-year-old Single White Male who was admitted due to passive
thoughts of wanting to be with his  girlfriend. 
Since his admission on 2018, he has been showing very slow improvement and 
no behavioral issues on the unit. He continues to claim episodic wishes of death
but has denied thoughts of suicide yesterday and today.
 
Treatment Plan Update:
D/C Home to follow up with OPS

## 2018-08-08 NOTE — SOCIAL WORKER PROG NOTE PSYCH
Social Work Progress Note
Progress Note
9:10am
This writer spoke with Chasity at [facility] ([phone], ext. [phone]) for discharge 
clinical.